# Patient Record
Sex: MALE | NOT HISPANIC OR LATINO | URBAN - METROPOLITAN AREA
[De-identification: names, ages, dates, MRNs, and addresses within clinical notes are randomized per-mention and may not be internally consistent; named-entity substitution may affect disease eponyms.]

---

## 2021-07-23 ENCOUNTER — APPOINTMENT (RX ONLY)
Dept: URBAN - METROPOLITAN AREA CLINIC 86 | Facility: CLINIC | Age: 31
Setting detail: DERMATOLOGY
End: 2021-07-23

## 2021-07-23 VITALS — WEIGHT: 140 LBS | HEIGHT: 68 IN

## 2021-07-23 DIAGNOSIS — L50.3 DERMATOGRAPHIC URTICARIA: ICD-10-CM

## 2021-07-23 DIAGNOSIS — L85.3 XEROSIS CUTIS: ICD-10-CM | Status: INADEQUATELY CONTROLLED

## 2021-07-23 DIAGNOSIS — L29.8 OTHER PRURITUS: ICD-10-CM | Status: INADEQUATELY CONTROLLED

## 2021-07-23 DIAGNOSIS — L29.89 OTHER PRURITUS: ICD-10-CM | Status: INADEQUATELY CONTROLLED

## 2021-07-23 PROCEDURE — ? PRESCRIPTION MEDICATION MANAGEMENT

## 2021-07-23 PROCEDURE — ? ADDITIONAL NOTES

## 2021-07-23 PROCEDURE — ? COUNSELING

## 2021-07-23 PROCEDURE — ? TREATMENT REGIMEN

## 2021-07-23 PROCEDURE — 99203 OFFICE O/P NEW LOW 30 MIN: CPT

## 2021-07-23 ASSESSMENT — LOCATION DETAILED DESCRIPTION DERM
LOCATION DETAILED: LEFT VENTRAL PROXIMAL FOREARM
LOCATION DETAILED: 3RD WEB SPACE RIGHT HAND
LOCATION DETAILED: RIGHT INFERIOR CENTRAL MALAR CHEEK

## 2021-07-23 ASSESSMENT — LOCATION SIMPLE DESCRIPTION DERM
LOCATION SIMPLE: RIGHT CHEEK
LOCATION SIMPLE: LEFT FOREARM
LOCATION SIMPLE: RIGHT HAND

## 2021-07-23 ASSESSMENT — LOCATION ZONE DERM
LOCATION ZONE: HAND
LOCATION ZONE: ARM
LOCATION ZONE: FACE

## 2021-07-23 NOTE — PROCEDURE: ADDITIONAL NOTES
Additional Notes: -Recommend resting his arm in a different angle/position \\n-Recommend resting arm on pad
Render Risk Assessment In Note?: no
Detail Level: Simple

## 2021-07-23 NOTE — PROCEDURE: PRESCRIPTION MEDICATION MANAGEMENT
Plan: -Recommend creams that contain ceramide (Eucerin) apply 3 mins after washing hands
Render In Strict Bullet Format?: No
Detail Level: Zone
Samples Given: Eucerin x1